# Patient Record
Sex: MALE | Race: BLACK OR AFRICAN AMERICAN | NOT HISPANIC OR LATINO | URBAN - METROPOLITAN AREA
[De-identification: names, ages, dates, MRNs, and addresses within clinical notes are randomized per-mention and may not be internally consistent; named-entity substitution may affect disease eponyms.]

---

## 2024-05-20 ENCOUNTER — OFFICE VISIT (OUTPATIENT)
Dept: FAMILY MEDICINE | Facility: CLINIC | Age: 24
End: 2024-05-20

## 2024-05-20 VITALS
SYSTOLIC BLOOD PRESSURE: 112 MMHG | DIASTOLIC BLOOD PRESSURE: 72 MMHG | OXYGEN SATURATION: 99 % | HEART RATE: 61 BPM | TEMPERATURE: 98.2 F | RESPIRATION RATE: 16 BRPM

## 2024-05-20 DIAGNOSIS — S61.209A AVULSION OF FINGER, INITIAL ENCOUNTER: Primary | ICD-10-CM

## 2024-05-20 PROCEDURE — 90715 TDAP VACCINE 7 YRS/> IM: CPT | Performed by: PHYSICIAN ASSISTANT

## 2024-05-20 PROCEDURE — 99202 OFFICE O/P NEW SF 15 MIN: CPT | Mod: 25 | Performed by: PHYSICIAN ASSISTANT

## 2024-05-20 PROCEDURE — 90471 IMMUNIZATION ADMIN: CPT | Performed by: PHYSICIAN ASSISTANT

## 2024-05-20 NOTE — LETTER
May 21, 2024      Mary Calderon  [address] [facility] [address]        To Whom It May Concern:    Mary Calderon was seen in our clinic. He may return to work 5/22/24 without restrictions.      Sincerely,        Kota Major PA-C

## 2024-05-21 NOTE — PROGRESS NOTES
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    Diagnoses and all orders for this visit:    Avulsion of finger, initial encounter    Other orders  -     TDAP 7+ (ADACEL,BOOSTRIX)    Avulsion of the skin with flap already adhered and healing.  Suturing would not provide any significant benefit in aesthetics or infection prevention at this point.  Wound was numbed using let gel, then flushed profusely under the faucet with warm water.  Antibacterial ointment followed by nonadherent stick pad and bandage applied.  Given a finger brace to help protect while at work.  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    Kota Major PA-C      SUBJECTIVE:  Mary is a 24 year old male who presents to urgent care with a work-related injury.  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  He wrapped it and finished his shift.  This happened over 12 hours ago.  It is painful and feels swollen but it stopped bleeding     pertinent ROS neg other than the symptoms noted above in the HPI.     OBJECTIVE:  /72   Pulse 61   Temp 98.2  F (36.8  C) (Oral)   Resp 16   SpO2 99%    GENERAL: alert and no distress  EYES: Eyes grossly normal to inspection, PERRL and conjunctivae and sclerae normal  MS: no gross musculoskeletal defects noted, no edema  SKIN: Avulsion of the radial aspect of the index finger on right hand that is approximately 3 cm in total length, flap of skin has adhered to the underlying wound, slight gap that is partial-thickness of the inferior and proximal aspect of the 3 sided avulsion.  NEURO: Normal strength and tone, mentation intact and speech  normal    DIAGNOSTICS    No results found for any visits on 05/20/24.     No current outpatient medications on file.     No current facility-administered medications for this visit.      There is no problem list on file for this patient.     No past medical history on file.  No past surgical history on file.  No family history on file.  Social History     Tobacco Use    Smoking status: Never    Smokeless tobacco: Never   Substance Use Topics    Alcohol use: Not on file              The plan of care was discussed with the patient. They understand and agree with the course of treatment prescribed. A printed summary was given including instructions and medications.  The use of Dragon/Triviala dictation services may have been used to construct the content in this note; any grammatical or spelling errors are non-intentional. Please contact the author of this note directly if you are in need of any clarification.